# Patient Record
Sex: MALE | Race: WHITE | NOT HISPANIC OR LATINO | Employment: FULL TIME | URBAN - METROPOLITAN AREA
[De-identification: names, ages, dates, MRNs, and addresses within clinical notes are randomized per-mention and may not be internally consistent; named-entity substitution may affect disease eponyms.]

---

## 2023-07-09 DIAGNOSIS — J45.40 MODERATE PERSISTENT ASTHMA, UNCOMPLICATED (HHS-HCC): ICD-10-CM

## 2023-07-10 RX ORDER — MONTELUKAST SODIUM 10 MG/1
TABLET ORAL
Qty: 90 TABLET | Refills: 3 | Status: SHIPPED | OUTPATIENT
Start: 2023-07-10 | End: 2023-12-11 | Stop reason: SDUPTHER

## 2023-08-04 ENCOUNTER — TELEMEDICINE (OUTPATIENT)
Dept: PRIMARY CARE | Facility: CLINIC | Age: 53
End: 2023-08-04
Payer: COMMERCIAL

## 2023-08-04 DIAGNOSIS — J45.909 ASTHMA, UNSPECIFIED ASTHMA SEVERITY, UNSPECIFIED WHETHER COMPLICATED, UNSPECIFIED WHETHER PERSISTENT (HHS-HCC): ICD-10-CM

## 2023-08-04 DIAGNOSIS — J01.10 ACUTE NON-RECURRENT FRONTAL SINUSITIS: Primary | ICD-10-CM

## 2023-08-04 PROCEDURE — 99213 OFFICE O/P EST LOW 20 MIN: CPT | Performed by: NURSE PRACTITIONER

## 2023-08-04 RX ORDER — MULTIVITAMIN
1 TABLET ORAL DAILY
COMMUNITY
Start: 2019-02-08 | End: 2023-12-11 | Stop reason: ALTCHOICE

## 2023-08-04 RX ORDER — ALBUTEROL SULFATE 90 UG/1
AEROSOL, METERED RESPIRATORY (INHALATION)
COMMUNITY
End: 2023-08-04 | Stop reason: SDUPTHER

## 2023-08-04 RX ORDER — POLYETHYLENE GLYCOL 3350 17 G
POWDER IN PACKET (EA) ORAL
COMMUNITY
Start: 2019-02-08

## 2023-08-04 RX ORDER — LEVALBUTEROL TARTRATE 45 UG/1
2 AEROSOL, METERED ORAL EVERY 4 HOURS PRN
Qty: 18 G | Refills: 3 | Status: SHIPPED | OUTPATIENT
Start: 2023-08-04

## 2023-08-04 RX ORDER — FLUTICASONE PROPIONATE 50 MCG
2 SPRAY, SUSPENSION (ML) NASAL 2 TIMES DAILY
COMMUNITY
Start: 2017-10-27 | End: 2023-08-04 | Stop reason: SDUPTHER

## 2023-08-04 RX ORDER — PREDNISONE 20 MG/1
40 TABLET ORAL DAILY
Qty: 10 TABLET | Refills: 0 | Status: SHIPPED | OUTPATIENT
Start: 2023-08-04 | End: 2023-08-09

## 2023-08-04 RX ORDER — ASCORBIC ACID 500 MG
TABLET,CHEWABLE ORAL
COMMUNITY
Start: 2020-12-30 | End: 2023-12-11 | Stop reason: ALTCHOICE

## 2023-08-04 RX ORDER — ALBUTEROL SULFATE 90 UG/1
2 AEROSOL, METERED RESPIRATORY (INHALATION) EVERY 4 HOURS PRN
Qty: 18 G | Refills: 3 | Status: SHIPPED | OUTPATIENT
Start: 2023-08-04 | End: 2023-08-04

## 2023-08-04 RX ORDER — ACETAMINOPHEN 500 MG
1 TABLET ORAL DAILY
COMMUNITY
Start: 2021-09-10 | End: 2023-12-11 | Stop reason: ALTCHOICE

## 2023-08-04 RX ORDER — LOSARTAN POTASSIUM 50 MG/1
1 TABLET ORAL DAILY
COMMUNITY
Start: 2021-09-10 | End: 2023-10-10

## 2023-08-04 RX ORDER — ESCITALOPRAM OXALATE 5 MG/1
1 TABLET ORAL DAILY
COMMUNITY
Start: 2019-05-23 | End: 2023-11-21

## 2023-08-04 RX ORDER — FLUTICASONE PROPIONATE 50 MCG
2 SPRAY, SUSPENSION (ML) NASAL 2 TIMES DAILY
Qty: 16 G | Refills: 3 | Status: SHIPPED | OUTPATIENT
Start: 2023-08-04 | End: 2024-02-08 | Stop reason: SDUPTHER

## 2023-08-04 NOTE — PROGRESS NOTES
"Problem List Items Addressed This Visit       Asthma    Overview     Singulair and alb prn          Relevant Medications    albuterol 90 mcg/actuation inhaler    fluticasone (Flonase) 50 mcg/actuation nasal spray    predniSONE (Deltasone) 20 mg tablet     Other Visit Diagnoses       Acute non-recurrent frontal sinusitis    -  Primary    just completing doxy BID x 7 from [facility]  steroid burst and flonase to reduce inflammation  fu IO next week if not improving           An interactive audio/visual telecommunication system which permits real time communications between the patient (at the originating site) and provider (at the distant site) was utilized to provide this telehealth service.    Verbal consent was requested and obtained from the patient for this telehealth visit.  We have confirmed the patient wishes to see me, Fauzia Mendoza, a board certified family nurse practitioner with an active [facility] license as well as verified the patient's identity and physical location in [place].    Subjective   Patient ID: Edwin Cavanaugh is a 53 y.o. male who presents for Sick Visit.  HPI  Resp sx x about a month  - cold sx  Lingered  Now head congestion  - L side of face and forehead   Nasal drainage  Occ cough  - no audible wheeze  - asthma cough  Using inhaler  - little relief   No ear pain  No sore throat  Using flonase  Tylenol  Sudafed with some relief     TeleDoc last Friday  - doxycycline BID x 7   - doesn't feel any better    Review of Systems    BP Readings from Last 3 Encounters:   09/16/22 126/77   09/10/21 125/80   11/13/20 113/78      Wt Readings from Last 3 Encounters:   09/16/22 65.8 kg (145 lb)   09/10/21 65.3 kg (144 lb)   08/13/20 67.6 kg (149 lb)      BMI:   Estimated body mass index is 24.89 kg/m² as calculated from the following:    Height as of 9/16/22: 1.626 m (5' 4\").    Weight as of 9/16/22: 65.8 kg (145 lb).    Objective   Physical Exam  Gen: Alert, NAD  Respiratory:  resp effort NL, speaking in " complete sentences   Neuro:  coordination intact   Mood: normal

## 2023-08-26 PROBLEM — J45.40 MODERATE PERSISTENT ASTHMA WITHOUT COMPLICATION (HHS-HCC): Status: ACTIVE | Noted: 2023-08-04

## 2023-08-26 PROBLEM — E55.9 VITAMIN D DEFICIENCY: Status: ACTIVE | Noted: 2023-08-26

## 2023-08-26 PROBLEM — M54.81 CERVICO-OCCIPITAL NEURALGIA: Status: ACTIVE | Noted: 2023-08-26

## 2023-08-26 PROBLEM — I10 HYPERTENSION, ESSENTIAL: Status: ACTIVE | Noted: 2023-08-26

## 2023-08-26 PROBLEM — D22.9 MELANOCYTIC NEVUS: Status: ACTIVE | Noted: 2023-08-26

## 2023-08-26 PROBLEM — K57.30 DIVERTICULOSIS OF COLON: Status: ACTIVE | Noted: 2023-08-26

## 2023-08-26 PROBLEM — M51.9 LUMBAR DISC DISEASE: Status: ACTIVE | Noted: 2023-08-26

## 2023-08-26 PROBLEM — F41.9 ANXIETY: Status: ACTIVE | Noted: 2023-08-26

## 2023-08-26 PROBLEM — M48.061 DEGENERATIVE LUMBAR SPINAL STENOSIS: Status: ACTIVE | Noted: 2023-08-26

## 2023-08-26 PROBLEM — K64.9 HEMORRHOIDS: Status: ACTIVE | Noted: 2023-08-26

## 2023-08-26 PROBLEM — Z00.00 ROUTINE ADULT HEALTH MAINTENANCE: Status: ACTIVE | Noted: 2023-08-26

## 2023-08-26 PROBLEM — D69.6 THROMBOCYTOPENIA (CMS-HCC): Status: ACTIVE | Noted: 2023-08-26

## 2023-10-10 DIAGNOSIS — I10 ESSENTIAL (PRIMARY) HYPERTENSION: ICD-10-CM

## 2023-10-10 RX ORDER — LOSARTAN POTASSIUM 50 MG/1
50 TABLET ORAL DAILY
Qty: 90 TABLET | Refills: 3 | Status: SHIPPED | OUTPATIENT
Start: 2023-10-10 | End: 2023-12-11 | Stop reason: SDUPTHER

## 2023-11-08 ENCOUNTER — OUTSIDE PROCEDURE (OUTPATIENT)
Dept: PRIMARY CARE | Facility: CLINIC | Age: 53
End: 2023-11-08
Payer: COMMERCIAL

## 2023-11-12 ENCOUNTER — TELEPHONE VISIT (OUTPATIENT)
Dept: PRIMARY CARE | Facility: CLINIC | Age: 53
End: 2023-11-12
Payer: COMMERCIAL

## 2023-11-12 DIAGNOSIS — U07.1 COVID-19: Primary | ICD-10-CM

## 2023-11-12 PROCEDURE — 99441 PR PHYS/QHP TELEPHONE EVALUATION 5-10 MIN: CPT | Performed by: INTERNAL MEDICINE

## 2023-11-12 RX ORDER — NIRMATRELVIR AND RITONAVIR 300-100 MG
3 KIT ORAL 2 TIMES DAILY
Qty: 30 TABLET | Refills: 0 | Status: SHIPPED | OUTPATIENT
Start: 2023-11-12 | End: 2023-11-17

## 2023-11-12 RX ORDER — NIRMATRELVIR AND RITONAVIR 300-100 MG
3 KIT ORAL 2 TIMES DAILY
Qty: 30 TABLET | Refills: 0 | Status: SHIPPED | OUTPATIENT
Start: 2023-11-12 | End: 2023-11-12 | Stop reason: SDUPTHER

## 2023-11-12 NOTE — PROGRESS NOTES
CC/HPI:   Covid-19 Testing.  Tested positive  today  Symptoms are congestion, run down and chills/cold  Feels like bad cold  Usig inhaler and chlorocetin  Sx started 4 days ago  Symptoms are better today    Active Problem List  Patient Active Problem List   Diagnosis    Asthma    Routine adult health maintenance    Allergic rhinitis    Anxiety    Degenerative lumbar spinal stenosis    Diverticulosis of colon    Hemorrhoids    Hypertension, essential    Lumbar disc disease    Melanocytic nevus    Moderate persistent asthma without complication    Cervico-occipital neuralgia    Thrombocytopenia (CMS/HCC)    Vitamin D deficiency    BMI 25.0-25.9,adult       Allergies and Medications  Penicillins  Current Outpatient Medications   Medication Instructions    ascorbic acid (Vitamin C) 500 mg chewable tablet oral    cholecalciferol (Vitamin D-3) 50 mcg (2,000 unit) capsule 1 capsule, oral, Daily    escitalopram (Lexapro) 5 mg tablet 1 tablet, oral, Daily    fluticasone (Flonase) 50 mcg/actuation nasal spray 2 sprays, Each Nostril, 2 times daily    inulin (Child's Fiber Select Gummies) 1.5 gram tablet,chewable 2 gummies a day    levalbuterol (Xopenex) 45 mcg/actuation inhaler 2 puffs, inhalation, Every 4 hours PRN    losartan (COZAAR) 50 mg, oral, Daily    montelukast (Singulair) 10 mg tablet TAKE 1 TABLET BY MOUTH EVERY DAY    multivitamin (Daily Multi-Vitamin) tablet 1 tablet, oral, Daily    nirmatrelvir-ritonavir (Paxlovid) 300 mg (150 mg x 2)-100 mg tablet therapy pack 3 tablets, oral, 2 times daily, Follow the instructions on the package       ROS otherwise negative aside from what was mentioned above in HPI.    Vitals  There were no vitals taken for this visit.  There is no height or weight on file to calculate BMI.  Physical Exam  Gen: Alert, NAD        Assessment and Plan:  Problem List Items Addressed This Visit    None  Visit Diagnoses       COVID-19    -  Primary    Sent Paxlovid  He may decide to take it  f/up  prn    Relevant Medications    nirmatrelvir-ritonavir (Paxlovid) 300 mg (150 mg x 2)-100 mg tablet therapy pack          Time spent prepping/preparing for visit as well as pre/post-visit charting: 3 minutes  Time spent directly with Patient: 5 minutes  Total Time: 8 minutes

## 2023-11-21 DIAGNOSIS — F41.9 ANXIETY: Primary | ICD-10-CM

## 2023-11-21 RX ORDER — ESCITALOPRAM OXALATE 5 MG/1
5 TABLET ORAL DAILY
Qty: 90 TABLET | Refills: 3 | Status: SHIPPED | OUTPATIENT
Start: 2023-11-21 | End: 2023-12-11 | Stop reason: SDUPTHER

## 2023-11-22 ENCOUNTER — TELEPHONE (OUTPATIENT)
Dept: PRIMARY CARE | Facility: CLINIC | Age: 53
End: 2023-11-22
Payer: COMMERCIAL

## 2023-11-22 DIAGNOSIS — I10 HYPERTENSION, ESSENTIAL: ICD-10-CM

## 2023-11-22 DIAGNOSIS — Z12.5 SCREENING FOR PROSTATE CANCER: Primary | ICD-10-CM

## 2023-11-22 DIAGNOSIS — E55.9 VITAMIN D DEFICIENCY: ICD-10-CM

## 2023-11-22 DIAGNOSIS — Z00.00 ROUTINE ADULT HEALTH MAINTENANCE: ICD-10-CM

## 2023-12-02 ENCOUNTER — LAB (OUTPATIENT)
Dept: LAB | Facility: LAB | Age: 53
End: 2023-12-02
Payer: COMMERCIAL

## 2023-12-02 DIAGNOSIS — I10 HYPERTENSION, ESSENTIAL: ICD-10-CM

## 2023-12-02 DIAGNOSIS — D64.9 ANEMIA, UNSPECIFIED TYPE: Primary | ICD-10-CM

## 2023-12-02 DIAGNOSIS — Z12.5 SCREENING FOR PROSTATE CANCER: ICD-10-CM

## 2023-12-02 DIAGNOSIS — E55.9 VITAMIN D DEFICIENCY: ICD-10-CM

## 2023-12-02 DIAGNOSIS — Z00.00 ROUTINE ADULT HEALTH MAINTENANCE: ICD-10-CM

## 2023-12-02 PROCEDURE — 83540 ASSAY OF IRON: CPT

## 2023-12-02 PROCEDURE — 82607 VITAMIN B-12: CPT

## 2023-12-02 PROCEDURE — 80053 COMPREHEN METABOLIC PANEL: CPT

## 2023-12-02 PROCEDURE — 82746 ASSAY OF FOLIC ACID SERUM: CPT

## 2023-12-02 PROCEDURE — 83550 IRON BINDING TEST: CPT

## 2023-12-02 PROCEDURE — 82728 ASSAY OF FERRITIN: CPT

## 2023-12-02 PROCEDURE — 82306 VITAMIN D 25 HYDROXY: CPT

## 2023-12-02 PROCEDURE — 84443 ASSAY THYROID STIM HORMONE: CPT

## 2023-12-02 PROCEDURE — 80061 LIPID PANEL: CPT

## 2023-12-02 PROCEDURE — 84153 ASSAY OF PSA TOTAL: CPT

## 2023-12-02 PROCEDURE — 85025 COMPLETE CBC W/AUTO DIFF WBC: CPT

## 2023-12-02 PROCEDURE — 36415 COLL VENOUS BLD VENIPUNCTURE: CPT

## 2023-12-02 PROCEDURE — 81003 URINALYSIS AUTO W/O SCOPE: CPT

## 2023-12-03 LAB
25(OH)D3 SERPL-MCNC: 30 NG/ML (ref 30–100)
ALBUMIN SERPL BCP-MCNC: 3.9 G/DL (ref 3.4–5)
ALP SERPL-CCNC: 55 U/L (ref 33–120)
ALT SERPL W P-5'-P-CCNC: 19 U/L (ref 10–52)
ANION GAP SERPL CALC-SCNC: 9 MMOL/L (ref 10–20)
APPEARANCE UR: ABNORMAL
AST SERPL W P-5'-P-CCNC: 19 U/L (ref 9–39)
BASOPHILS # BLD AUTO: 0.1 X10*3/UL (ref 0–0.1)
BASOPHILS NFR BLD AUTO: 1.7 %
BILIRUB SERPL-MCNC: 0.7 MG/DL (ref 0–1.2)
BILIRUB UR STRIP.AUTO-MCNC: NEGATIVE MG/DL
BUN SERPL-MCNC: 21 MG/DL (ref 6–23)
CALCIUM SERPL-MCNC: 9.2 MG/DL (ref 8.6–10.6)
CHLORIDE SERPL-SCNC: 108 MMOL/L (ref 98–107)
CHOLEST SERPL-MCNC: 183 MG/DL (ref 0–199)
CHOLESTEROL/HDL RATIO: 3.4
CO2 SERPL-SCNC: 30 MMOL/L (ref 21–32)
COLOR UR: YELLOW
CREAT SERPL-MCNC: 0.99 MG/DL (ref 0.5–1.3)
EOSINOPHIL # BLD AUTO: 0.54 X10*3/UL (ref 0–0.7)
EOSINOPHIL NFR BLD AUTO: 9.1 %
ERYTHROCYTE [DISTWIDTH] IN BLOOD BY AUTOMATED COUNT: 12.4 % (ref 11.5–14.5)
FERRITIN SERPL-MCNC: 132 NG/ML (ref 20–300)
FOLATE SERPL-MCNC: 15.1 NG/ML
GFR SERPL CREATININE-BSD FRML MDRD: >90 ML/MIN/1.73M*2
GLUCOSE SERPL-MCNC: 90 MG/DL (ref 74–99)
GLUCOSE UR STRIP.AUTO-MCNC: NEGATIVE MG/DL
HCT VFR BLD AUTO: 40.5 % (ref 41–52)
HDLC SERPL-MCNC: 53.5 MG/DL
HGB BLD-MCNC: 13.2 G/DL (ref 13.5–17.5)
HOLD SPECIMEN: NORMAL
IMM GRANULOCYTES # BLD AUTO: 0.01 X10*3/UL (ref 0–0.7)
IMM GRANULOCYTES NFR BLD AUTO: 0.2 % (ref 0–0.9)
IRON SATN MFR SERPL: 39 % (ref 25–45)
IRON SERPL-MCNC: 120 UG/DL (ref 35–150)
KETONES UR STRIP.AUTO-MCNC: NEGATIVE MG/DL
LDLC SERPL CALC-MCNC: 109 MG/DL
LEUKOCYTE ESTERASE UR QL STRIP.AUTO: NEGATIVE
LYMPHOCYTES # BLD AUTO: 1.73 X10*3/UL (ref 1.2–4.8)
LYMPHOCYTES NFR BLD AUTO: 29 %
MCH RBC QN AUTO: 31.1 PG (ref 26–34)
MCHC RBC AUTO-ENTMCNC: 32.6 G/DL (ref 32–36)
MCV RBC AUTO: 96 FL (ref 80–100)
MONOCYTES # BLD AUTO: 0.56 X10*3/UL (ref 0.1–1)
MONOCYTES NFR BLD AUTO: 9.4 %
NEUTROPHILS # BLD AUTO: 3.02 X10*3/UL (ref 1.2–7.7)
NEUTROPHILS NFR BLD AUTO: 50.6 %
NITRITE UR QL STRIP.AUTO: NEGATIVE
NON HDL CHOLESTEROL: 130 MG/DL (ref 0–149)
NRBC BLD-RTO: 0 /100 WBCS (ref 0–0)
PH UR STRIP.AUTO: 5 [PH]
PLATELET # BLD AUTO: 236 X10*3/UL (ref 150–450)
POTASSIUM SERPL-SCNC: 4.9 MMOL/L (ref 3.5–5.3)
PROT SERPL-MCNC: 6.6 G/DL (ref 6.4–8.2)
PROT UR STRIP.AUTO-MCNC: NEGATIVE MG/DL
PSA SERPL-MCNC: 0.87 NG/ML
RBC # BLD AUTO: 4.24 X10*6/UL (ref 4.5–5.9)
RBC # UR STRIP.AUTO: NEGATIVE /UL
SODIUM SERPL-SCNC: 142 MMOL/L (ref 136–145)
SP GR UR STRIP.AUTO: 1.02
TIBC SERPL-MCNC: 305 UG/DL (ref 240–445)
TRIGL SERPL-MCNC: 103 MG/DL (ref 0–149)
TSH SERPL-ACNC: 2.81 MIU/L (ref 0.44–3.98)
UIBC SERPL-MCNC: 185 UG/DL (ref 110–370)
UROBILINOGEN UR STRIP.AUTO-MCNC: <2 MG/DL
VIT B12 SERPL-MCNC: 351 PG/ML (ref 211–911)
VLDL: 21 MG/DL (ref 0–40)
WBC # BLD AUTO: 6 X10*3/UL (ref 4.4–11.3)

## 2023-12-08 ENCOUNTER — LAB (OUTPATIENT)
Dept: LAB | Facility: LAB | Age: 53
End: 2023-12-08
Payer: COMMERCIAL

## 2023-12-08 DIAGNOSIS — D64.9 ANEMIA, UNSPECIFIED TYPE: ICD-10-CM

## 2023-12-08 PROCEDURE — 85025 COMPLETE CBC W/AUTO DIFF WBC: CPT

## 2023-12-08 PROCEDURE — 36415 COLL VENOUS BLD VENIPUNCTURE: CPT

## 2023-12-09 LAB
BASOPHILS # BLD AUTO: 0.1 X10*3/UL (ref 0–0.1)
BASOPHILS NFR BLD AUTO: 0.9 %
EOSINOPHIL # BLD AUTO: 0.39 X10*3/UL (ref 0–0.7)
EOSINOPHIL NFR BLD AUTO: 3.7 %
ERYTHROCYTE [DISTWIDTH] IN BLOOD BY AUTOMATED COUNT: 12.1 % (ref 11.5–14.5)
HCT VFR BLD AUTO: 42 % (ref 41–52)
HGB BLD-MCNC: 14 G/DL (ref 13.5–17.5)
IMM GRANULOCYTES # BLD AUTO: 0.05 X10*3/UL (ref 0–0.7)
IMM GRANULOCYTES NFR BLD AUTO: 0.5 % (ref 0–0.9)
LYMPHOCYTES # BLD AUTO: 1.83 X10*3/UL (ref 1.2–4.8)
LYMPHOCYTES NFR BLD AUTO: 17.2 %
MCH RBC QN AUTO: 31.5 PG (ref 26–34)
MCHC RBC AUTO-ENTMCNC: 33.3 G/DL (ref 32–36)
MCV RBC AUTO: 94 FL (ref 80–100)
MONOCYTES # BLD AUTO: 0.76 X10*3/UL (ref 0.1–1)
MONOCYTES NFR BLD AUTO: 7.1 %
NEUTROPHILS # BLD AUTO: 7.5 X10*3/UL (ref 1.2–7.7)
NEUTROPHILS NFR BLD AUTO: 70.6 %
NRBC BLD-RTO: 0 /100 WBCS (ref 0–0)
PLATELET # BLD AUTO: 228 X10*3/UL (ref 150–450)
RBC # BLD AUTO: 4.45 X10*6/UL (ref 4.5–5.9)
WBC # BLD AUTO: 10.6 X10*3/UL (ref 4.4–11.3)

## 2023-12-11 ENCOUNTER — OFFICE VISIT (OUTPATIENT)
Dept: PRIMARY CARE | Facility: CLINIC | Age: 53
End: 2023-12-11
Payer: COMMERCIAL

## 2023-12-11 ENCOUNTER — APPOINTMENT (OUTPATIENT)
Dept: PRIMARY CARE | Facility: CLINIC | Age: 53
End: 2023-12-11
Payer: COMMERCIAL

## 2023-12-11 ENCOUNTER — OFFICE VISIT (OUTPATIENT)
Dept: DERMATOLOGY | Facility: CLINIC | Age: 53
End: 2023-12-11
Payer: COMMERCIAL

## 2023-12-11 ENCOUNTER — LAB (OUTPATIENT)
Dept: LAB | Facility: LAB | Age: 53
End: 2023-12-11
Payer: COMMERCIAL

## 2023-12-11 DIAGNOSIS — L81.4 LENTIGO: ICD-10-CM

## 2023-12-11 DIAGNOSIS — J32.9 RECURRENT SINUSITIS: ICD-10-CM

## 2023-12-11 DIAGNOSIS — Z12.11 SCREEN FOR COLON CANCER: ICD-10-CM

## 2023-12-11 DIAGNOSIS — E55.9 VITAMIN D DEFICIENCY: ICD-10-CM

## 2023-12-11 DIAGNOSIS — F41.9 ANXIETY: ICD-10-CM

## 2023-12-11 DIAGNOSIS — J45.40 MODERATE PERSISTENT ASTHMA, UNCOMPLICATED (HHS-HCC): ICD-10-CM

## 2023-12-11 DIAGNOSIS — D22.9 NEVUS: ICD-10-CM

## 2023-12-11 DIAGNOSIS — L82.1 SEBORRHEIC KERATOSIS: ICD-10-CM

## 2023-12-11 DIAGNOSIS — Z12.83 SKIN CANCER SCREENING: Primary | ICD-10-CM

## 2023-12-11 DIAGNOSIS — D18.01 CHERRY ANGIOMA: ICD-10-CM

## 2023-12-11 DIAGNOSIS — Z12.5 SCREENING FOR PROSTATE CANCER: ICD-10-CM

## 2023-12-11 DIAGNOSIS — I10 HYPERTENSION, ESSENTIAL: ICD-10-CM

## 2023-12-11 DIAGNOSIS — Z00.00 ROUTINE ADULT HEALTH MAINTENANCE: Primary | ICD-10-CM

## 2023-12-11 PROBLEM — D69.6 THROMBOCYTOPENIA (CMS-HCC): Status: RESOLVED | Noted: 2023-08-26 | Resolved: 2023-12-11

## 2023-12-11 LAB
IGA SERPL-MCNC: 238 MG/DL (ref 70–400)
IGG SERPL-MCNC: 1360 MG/DL (ref 700–1600)
IGM SERPL-MCNC: 66 MG/DL (ref 40–230)

## 2023-12-11 PROCEDURE — 3079F DIAST BP 80-89 MM HG: CPT | Performed by: INTERNAL MEDICINE

## 2023-12-11 PROCEDURE — 99213 OFFICE O/P EST LOW 20 MIN: CPT | Performed by: NURSE PRACTITIONER

## 2023-12-11 PROCEDURE — 3080F DIAST BP >= 90 MM HG: CPT | Performed by: INTERNAL MEDICINE

## 2023-12-11 PROCEDURE — 1036F TOBACCO NON-USER: CPT | Performed by: NURSE PRACTITIONER

## 2023-12-11 PROCEDURE — 99396 PREV VISIT EST AGE 40-64: CPT | Performed by: INTERNAL MEDICINE

## 2023-12-11 PROCEDURE — 3077F SYST BP >= 140 MM HG: CPT | Performed by: INTERNAL MEDICINE

## 2023-12-11 PROCEDURE — 36415 COLL VENOUS BLD VENIPUNCTURE: CPT

## 2023-12-11 PROCEDURE — 3078F DIAST BP <80 MM HG: CPT | Performed by: INTERNAL MEDICINE

## 2023-12-11 PROCEDURE — 82784 ASSAY IGA/IGD/IGG/IGM EACH: CPT

## 2023-12-11 PROCEDURE — 3075F SYST BP GE 130 - 139MM HG: CPT | Performed by: INTERNAL MEDICINE

## 2023-12-11 PROCEDURE — 1036F TOBACCO NON-USER: CPT | Performed by: INTERNAL MEDICINE

## 2023-12-11 RX ORDER — LOSARTAN POTASSIUM 100 MG/1
100 TABLET ORAL DAILY
Qty: 90 TABLET | Refills: 3 | Status: SHIPPED | OUTPATIENT
Start: 2023-12-11

## 2023-12-11 RX ORDER — ACETAMINOPHEN 500 MG
2000 TABLET ORAL DAILY
Qty: 1 CAPSULE | Refills: 0
Start: 2023-12-11

## 2023-12-11 RX ORDER — MONTELUKAST SODIUM 10 MG/1
10 TABLET ORAL DAILY
Qty: 90 TABLET | Refills: 3 | Status: SHIPPED | OUTPATIENT
Start: 2023-12-11

## 2023-12-11 RX ORDER — ESCITALOPRAM OXALATE 5 MG/1
5 TABLET ORAL DAILY
Qty: 90 TABLET | Refills: 3 | Status: SHIPPED | OUTPATIENT
Start: 2023-12-11

## 2023-12-11 ASSESSMENT — PATIENT HEALTH QUESTIONNAIRE - PHQ9
1. LITTLE INTEREST OR PLEASURE IN DOING THINGS: NOT AT ALL
2. FEELING DOWN, DEPRESSED OR HOPELESS: NOT AT ALL
SUM OF ALL RESPONSES TO PHQ9 QUESTIONS 1 AND 2: 0

## 2023-12-11 NOTE — Clinical Note
What was his repeat BP? Need him to send in Bps via my chart over next 2 weeks Can you set a reminde so we dont forget to get these before 12/31/23?

## 2023-12-11 NOTE — PROGRESS NOTES
Subjective     Edwin Cavanaugh is a 53 y.o. male who presents for the following: Skin Check.     Established patient in for annual full-body skin exam.    Review of Systems:  No other skin or systemic complaints other than what is documented elsewhere in the note.    The following portions of the chart were reviewed this encounter and updated as appropriate:       Skin Cancer History  No skin cancer on file.    Specialty Problems          Dermatology Problems    Melanocytic nevus     Comment on above: will have DERM eval;          Past Medical History:  Edwin Cavanaugh  has a past medical history of H/O cardiovascular stress test, H/O CT scan (12/30/2020), H/O Doppler ultrasound, H/O Doppler ultrasound, H/O magnetic resonance imaging of brain and brain stem, H/O magnetic resonance imaging of lumbar spine, H/O x-ray of lumbar spine, History of PFTs (05/23/2019), and History of PFTs (02/08/2019).    Past Surgical History:  Edwin Cavanaugh  has a past surgical history that includes Colonoscopy (07/06/2018); Other surgical history (07/06/2018); and Other surgical history (07/06/2018).    Family History:  Patient family history includes No Known Problems in his mother.    Social History:  Edwin Cavanaugh  reports that he has never smoked. He has never used smokeless tobacco. He reports current alcohol use. No history on file for drug use.    Allergies:  Penicillins    Current Medications / CAM's:    Current Outpatient Medications:     ascorbic acid (Vitamin C) 500 mg chewable tablet, Chew., Disp: , Rfl:     cholecalciferol (Vitamin D-3) 50 mcg (2,000 unit) capsule, Take 1 capsule (50 mcg) by mouth once daily., Disp: , Rfl:     escitalopram (Lexapro) 5 mg tablet, TAKE 1 TABLET BY MOUTH EVERY DAY, Disp: 90 tablet, Rfl: 3    fluticasone (Flonase) 50 mcg/actuation nasal spray, Administer 2 sprays into each nostril 2 times a day., Disp: 16 g, Rfl: 3    inulin (Child's Fiber Select Gummies) 1.5 gram tablet,chewable, 2 gummies a day,  Disp: , Rfl:     levalbuterol (Xopenex) 45 mcg/actuation inhaler, Inhale 2 puffs every 4 hours if needed for wheezing or shortness of breath., Disp: 18 g, Rfl: 3    losartan (Cozaar) 50 mg tablet, TAKE 1 TABLET BY MOUTH EVERY DAY, Disp: 90 tablet, Rfl: 3    montelukast (Singulair) 10 mg tablet, TAKE 1 TABLET BY MOUTH EVERY DAY, Disp: 90 tablet, Rfl: 3    multivitamin (Daily Multi-Vitamin) tablet, Take 1 tablet by mouth once daily., Disp: , Rfl:      Objective   Well appearing patient in no apparent distress; mood and affect are within normal limits.    A full examination was performed including scalp, head, eyes, ears, nose, lips, neck, chest, axillae, abdomen, back, buttocks, bilateral upper extremities, bilateral lower extremities, hands, feet, fingers, toes, fingernails, and toenails. All findings within normal limits unless otherwise noted below.    Assessment/Plan   1. Skin cancer screening    The patient presented for a routine skin examination today. There are no specific concerns regarding skin health and no new or changing moles, lesions, or rashes.     Assessment: Based on the comprehensive skin examination, there were no concerning or abnormal findings. The patient's skin appeared to be in good health, without any notable dermatologic conditions or lesions.    Plan: Given the absence of any significant skin findings, no specific interventions or treatments are warranted at this time. The patient was educated on the importance of regular skin self-examinations and advised to promptly report any changes or concerns. Routine follow-up for a skin examination was recommended.    -These lesions have benign, reassuring patterns on dermoscopy.  -There were no concerning features found on exam today.  -Recommend continued self-observation, and to contact the office if any changes in nevi are  noticed.    Discussed/information given on safe sun practices and use of sunscreen, sun protective clothing or sun  "avoidance. Recommend to use OTC medication of sunscreen SPF 30 or higher on a daily basis prior to sun exposure to reduce the risk of skin cancer.    Contact Office if: Any lesions change in size, shape or color; itch, bum or bleed.         2. Nevus  Multiple benign appearing flesh colored to pigmented macules and papules     Plan: Counseling.  I counseled the patient regarding the following:  Instructions: Monthly self-skin checks to monitor for any changes in moles are recommended. Expectations: Benign Nevi are pigmented nests of cells within the skin.No treatment is necessary. Contact Office if: Any moles change in size, shape or color; itch, bum or bleed.    3. Lentigo  Benign pigmented macules appearing in sun exposed areas     Solar lentigo (a type of lentigo also known as a senile lentigo, age spot, or liver spot) is a benign pigmented macule appearing on fair-skinned individuals that is related to ultraviolet radiation (UVR) exposure, typically from the sun.     PLAN:  Limiting sun exposure through avoidance, protective clothing, and use of sunscreens can help prevent the appearance of solar lentigines.    If lesion changes or becomes symptomatic she should return to clinic    4. Kimball angioma  Small (2-10 mm), bright red or violaceous macules or smooth, domed papules    PLAN:  Reassurance these lesions are benign  Treatment is only indicated in the case of irritation or hemorrhage    5. Seborrheic keratosis    Seborrheic keratoses (SKs) are extremely common benign neoplasms of the skin. There can be few or hundreds of these raised, \"stuck-on\"-appearing papules and plaques with well-defined borders. The cause is unknown, although there is a familial trait for the development of multiple SKs.      SKs tend to increase in incidence and number with increasing age.     Skin Care: Seborrheic Keratoses are benign. No treatment is necessary.    Patient was instructed to call the office if any lesions become " irritated or inflamed

## 2023-12-11 NOTE — PROGRESS NOTES
His BP was 146/80  I will call and tell him jesi we need the numbers before the 31st and I will make a note to call him on the 22nd to make sure he is sending it the following week.

## 2023-12-11 NOTE — PROGRESS NOTES
ANNUAL CPE VISIT  Chief Complaint   Patient presents with    Annual Exam     HPI: BP at home not checked    C/O shooting pain in one spot side, leg, arm  Small concentrated spot when occurs  Finger can point to the spot  Used to get them in the head    When gets a cold gets 4 weeks of symptoms  Always pressure/HA and takes forever to go away  Still feels symptoms from his Covid infection 11/9/23     Hamstring hurt after COVID  Is a runner  Ball of his foot hurts  Tried different shoes    Lavs reviewed:  Component      Latest Ref Rng 12/2/2023   WBC      4.4 - 11.3 x10*3/uL 6.0    nRBC      0.0 - 0.0 /100 WBCs 0.0    RBC      4.50 - 5.90 x10*6/uL 4.24 (L)    HEMOGLOBIN      13.5 - 17.5 g/dL 13.2 (L)    HEMATOCRIT      41.0 - 52.0 % 40.5 (L)    MCV      80 - 100 fL 96    MCH      26.0 - 34.0 pg 31.1    MCHC      32.0 - 36.0 g/dL 32.6    RED CELL DISTRIBUTION WIDTH      11.5 - 14.5 % 12.4    Platelets      150 - 450 x10*3/uL 236    Neutrophils %      40.0 - 80.0 % 50.6    Immature Granulocytes %, Automated      0.0 - 0.9 % 0.2    Lymphocytes %      13.0 - 44.0 % 29.0    Monocytes %      2.0 - 10.0 % 9.4    Eosinophils %      0.0 - 6.0 % 9.1    Basophils %      0.0 - 2.0 % 1.7    Neutrophils Absolute      1.20 - 7.70 x10*3/uL 3.02    Immature Granulocytes Absolute, Automated      0.00 - 0.70 x10*3/uL 0.01    Lymphocytes Absolute      1.20 - 4.80 x10*3/uL 1.73    Monocytes Absolute      0.10 - 1.00 x10*3/uL 0.56    Eosinophils Absolute      0.00 - 0.70 x10*3/uL 0.54    Basophils Absolute      0.00 - 0.10 x10*3/uL 0.10    GLUCOSE      74 - 99 mg/dL 90    SODIUM      136 - 145 mmol/L 142    POTASSIUM      3.5 - 5.3 mmol/L 4.9    CHLORIDE      98 - 107 mmol/L 108 (H)    Bicarbonate      21 - 32 mmol/L 30    Anion Gap      10 - 20 mmol/L 9 (L)    Blood Urea Nitrogen      6 - 23 mg/dL 21    Creatinine      0.50 - 1.30 mg/dL 0.99    EGFR      >60 mL/min/1.73m*2 >90    Calcium      8.6 - 10.6 mg/dL 9.2    Albumin      3.4 - 5.0  g/dL 3.9    Alkaline Phosphatase      33 - 120 U/L 55    Total Protein      6.4 - 8.2 g/dL 6.6    AST      9 - 39 U/L 19    Bilirubin Total      0.0 - 1.2 mg/dL 0.7    ALT      10 - 52 U/L 19    Color, Urine      Straw, Yellow  Yellow    Appearance, Urine      Clear  Hazy ! (N)    Specific Gravity, Urine      1.005 - 1.035  1.025    pH, Urine      5.0, 5.5, 6.0, 6.5, 7.0, 7.5, 8.0  5.0    Protein, Urine      NEGATIVE mg/dL NEGATIVE    Glucose, Urine      NEGATIVE mg/dL NEGATIVE    Blood, Urine      NEGATIVE  NEGATIVE    Ketones, Urine      NEGATIVE mg/dL NEGATIVE    Bilirubin, Urine      NEGATIVE  NEGATIVE    Urobilinogen, Urine      <2.0 mg/dL <2.0    Nitrite, Urine      NEGATIVE  NEGATIVE    Leukocyte Esterase, Urine      NEGATIVE  NEGATIVE    CHOLESTEROL      0 - 199 mg/dL 183    HDL CHOLESTEROL      mg/dL 53.5    Cholesterol/HDL Ratio 3.4    LDL Calculated      <=99 mg/dL 109 (H)    VLDL      0 - 40 mg/dL 21    TRIGLYCERIDES      0 - 149 mg/dL 103    Non HDL Cholesterol      0 - 149 mg/dL 130    IRON      35 - 150 ug/dL 120    UIBC      110 - 370 ug/dL 185    TIBC      240 - 445 ug/dL 305    % Saturation      25 - 45 % 39    Vitamin D, 25-Hydroxy, Total      30 - 100 ng/mL 30    Thyroid Stimulating Hormone      0.44 - 3.98 mIU/L 2.81    Prostate Specific Antigen,Screen      <=4.00 ng/mL 0.87    Extra Tube Hold for add-ons.    FERRITIN      20 - 300 ng/mL 132    Vitamin B12      211 - 911 pg/mL 351    FOLATE      >5.0 ng/mL 15.1       Component      Latest Ref Rng 12/8/2023   WBC      4.4 - 11.3 x10*3/uL 10.6    nRBC      0.0 - 0.0 /100 WBCs 0.0    RBC      4.50 - 5.90 x10*6/uL 4.45 (L)    HEMOGLOBIN      13.5 - 17.5 g/dL 14.0    HEMATOCRIT      41.0 - 52.0 % 42.0    MCV      80 - 100 fL 94    MCH      26.0 - 34.0 pg 31.5    MCHC      32.0 - 36.0 g/dL 33.3    RED CELL DISTRIBUTION WIDTH      11.5 - 14.5 % 12.1    Platelets      150 - 450 x10*3/uL 228    Neutrophils %      40.0 - 80.0 % 70.6    Immature  Granulocytes %, Automated      0.0 - 0.9 % 0.5    Lymphocytes %      13.0 - 44.0 % 17.2    Monocytes %      2.0 - 10.0 % 7.1    Eosinophils %      0.0 - 6.0 % 3.7    Basophils %      0.0 - 2.0 % 0.9    Neutrophils Absolute      1.20 - 7.70 x10*3/uL 7.50    Immature Granulocytes Absolute, Automated      0.00 - 0.70 x10*3/uL 0.05    Lymphocytes Absolute      1.20 - 4.80 x10*3/uL 1.83    Monocytes Absolute      0.10 - 1.00 x10*3/uL 0.76    Eosinophils Absolute      0.00 - 0.70 x10*3/uL 0.39    Basophils Absolute      0.00 - 0.10 x10*3/uL 0.10             Assessment and Plan:  Problem List Items Addressed This Visit          High    Routine adult health maintenance - Primary    Overview     COVID 19 Moderna Vaccine 3/19/21, 4/16/21, 12/11/21, 9/16/22, 10/13/23  Influenza 9/13/23  Pneumococcal-13 Vac Bctflotuc94/11/2014   TD Adult2/1/2008  TDaP 8/3/18  PSA 6/15/19 1.56; 9/3/21 (1.1)  Cscope 2014(10yrs)  Influenza 11/6/20         Current Assessment & Plan     Annual Wellness exam completed   Preventive Health History reviewed  Ordered:   Labs    Cscope            Relevant Orders    Comprehensive Metabolic Panel    CBC and Auto Differential    Lipid Panel    Urinalysis with Reflex Microscopic and Culture       Medium    Anxiety    Overview     Comment on above: GAD76/2020 = 9/21;  GAD76/2020 = 9/21On SSRI;         Relevant Medications    escitalopram (Lexapro) 5 mg tablet    Other Relevant Orders    TSH with reflex to Free T4 if abnormal    Hypertension, essential    Overview     Goal <130/80  6/20: our cuff 124/75 hr 50s his cuff 126/80 hr 57   On ARB         Current Assessment & Plan     Increase ARB to 100mg         Relevant Orders    Albumin, urine, random    RESOLVED: Moderate persistent asthma, uncomplicated    Relevant Medications    montelukast (Singulair) 10 mg tablet    losartan (Cozaar) 100 mg tablet    Screen for colon cancer    Relevant Orders    Colonoscopy Screening; Average Risk Patient    Screening for  "prostate cancer    Relevant Orders    Prostate Specific Antigen, Screen    Vitamin D deficiency    Overview     12/23: 30  Goal ~50         Current Assessment & Plan     Add 2K daily         Relevant Medications    cholecalciferol (Vitamin D3) 50 mcg (2,000 unit) capsule    Other Relevant Orders    Vitamin D 25-Hydroxy,Total (for eval of Vitamin D levels)     Other Visit Diagnoses       Recurrent sinusitis        Discussed better sinus care when infected  (rinse, nettipot etc)  Will get IG levels    Relevant Orders    Immunoglobulins, IgG, IgA, IgM (Completed)            ROS otherwise negative aside from what was mentioned above in HPI.    Vitals  /80   Pulse 58   Temp 36.9 °C (98.4 °F) (Temporal)   Ht 1.626 m (5' 4\")   Wt 67.1 kg (148 lb)   BMI 25.40 kg/m²   Body mass index is 25.4 kg/m².  Physical Exam  Gen: Alert, NAD  HEENT:  Normal exam  Neck:  No masses/nodes palpable; Thyroid nontender and without nodules; No DAVID  Respiratory:  Lungs CTAB  CV: RRR  Neuro:  Gross motor and sensory intact  Skin:  No suspicious lesions present  Breast: No masses or axillary lymphadenopathy  ALYSIA: Prostate not enlarged. No prostate mass or nodule(s) palpated, brown stool. + Internal hemorroid palpated (Pt declined chaperone)    Active Problem List  Patient Active Problem List   Diagnosis    Asthma    Routine adult health maintenance    Allergic rhinitis    Anxiety    Degenerative lumbar spinal stenosis    Diverticulosis of colon    Hemorrhoids    Hypertension, essential    Lumbar disc disease    Melanocytic nevus    Moderate persistent asthma without complication    Cervico-occipital neuralgia    Vitamin D deficiency    BMI 25.0-25.9,adult    Screening for prostate cancer    Screen for colon cancer       Comprehensive Medical/Surgical/Social/Family History  Past Medical History:   Diagnosis Date    H/O cardiovascular stress test     5/12: normal 1/2021:Summary: 1. The resting ejection fraction was estimated at 60 to 65% " with a peak exercise ejection fraction estimated at 70 to 75%. 2. Peak HR= 164bpm which is 98% of APMHR. 3. Peak BP= 188/74. 4. METS acheived= 13.4. 5. Above average exercise capacity. 6. No ECG changes from baseline. 7. No echocardiographic or electrocardiographic evidence for ischemia at a maximal workload.    H/O CT scan 12/30/2020    10/11/21: CT Calcium score =0    H/O Doppler ultrasound     Mild-moderate stenosis at the proximal segment without plaque. There appears to be a diameter mismatch of proximal to mid subclavian artery US carotids 6/20: IMPRESSION: Minimal bilateral carotid plaque; no stenosis greater than 50%.    H/O Doppler ultrasound     6/2020: bilat carotid US Minimal bilateral carotid plaque; no stenosis greater than 50%.    H/O magnetic resonance imaging of brain and brain stem     05/04: MILD PARANASAL SINUS INFLAMMATORY CHANGES.  OTHERWISE NORMAL    H/O magnetic resonance imaging of lumbar spine     4/17: degenrative changes L4-5 and L5-S1 w/disc space narrpowing and disk dessication, posterior disk bbulge. FAcet joint sclerosis and hypertrophy and LF hypertrophy, moderate stenosis of bilateral lateral recesses, at L4-5, wqorse on right. MIld stenosis lateral receses laterally L5-S1 worsde left. impingement upon exiting nerve roots biltarelly L4-5 and L5-S1 left. bilateral renal cysts    H/O x-ray of lumbar spine     2017: MILD DEGENERATIVE CHANGES    History of PFTs 05/23/2019    Reactive airway component with significant bronchodilator response in small airways despite normal PFTs. Repeat PFTs with lung volumes rec'd. 5/12    History of PFTs 02/08/2019    2008: Reactive airway component with significant bronchodilator response in small airways despite normal PFTs. Repeat PFTs with lung volumes rec'd     Past Surgical History:   Procedure Laterality Date    COLONOSCOPY      5/14: Diverticulosis in the sigmoid colon. Internal & External hemorrhoids. repeat in 10 years    OTHER SURGICAL  HISTORY  07/06/2018    Pulmonary Function Tests    OTHER SURGICAL HISTORY  07/06/2018    Oral Surgery Tooth Extraction [facility]     Social History     Social History Narrative     ([place]), one daughter    Works at [employer]    Nonsmoker    Occ ETOH    ---    Family History:    M: Gastric CA, Hypertension       F: Anxiety, AKs, Sleep issues, ET?                 B:  Asthma                                                    S:  Allergies                                                        Allergies and Medications  Lisinopril and Penicillins  Current Outpatient Medications   Medication Instructions    cholecalciferol (VITAMIN D3) 50 mcg, oral, Daily    escitalopram (LEXAPRO) 5 mg, oral, Daily    fluticasone (Flonase) 50 mcg/actuation nasal spray 2 sprays, Each Nostril, 2 times daily    hydroCHLOROthiazide (HYDRODIURIL) 12.5 mg, oral, Daily    inulin (Child's Fiber Select Gummies) 1.5 gram tablet,chewable 2 gummies a day    levalbuterol (Xopenex) 45 mcg/actuation inhaler 2 puffs, inhalation, Every 4 hours PRN    losartan (COZAAR) 100 mg, oral, Daily    montelukast (SINGULAIR) 10 mg, oral, Daily

## 2023-12-11 NOTE — ASSESSMENT & PLAN NOTE
Add 2K daily  
Annual Wellness exam completed   Preventive Health History reviewed  Ordered:   Labs    Cscope     
Increase ARB to 100mg  
Patient lab wnl. endorses pain resolved. has dialysis in a few hours. endorses that he will f.u pmd. return precaution provided

## 2023-12-23 VITALS
BODY MASS INDEX: 25.27 KG/M2 | TEMPERATURE: 98.4 F | DIASTOLIC BLOOD PRESSURE: 80 MMHG | WEIGHT: 148 LBS | HEART RATE: 58 BPM | HEIGHT: 64 IN | SYSTOLIC BLOOD PRESSURE: 146 MMHG

## 2023-12-23 DIAGNOSIS — I10 HYPERTENSION, ESSENTIAL: Primary | ICD-10-CM

## 2023-12-23 RX ORDER — HYDROCHLOROTHIAZIDE 12.5 MG/1
12.5 TABLET ORAL DAILY
Qty: 30 TABLET | Refills: 0 | Status: SHIPPED | OUTPATIENT
Start: 2023-12-23 | End: 2024-01-17

## 2024-01-17 DIAGNOSIS — I10 HYPERTENSION, ESSENTIAL: ICD-10-CM

## 2024-01-17 RX ORDER — HYDROCHLOROTHIAZIDE 12.5 MG/1
12.5 TABLET ORAL DAILY
Qty: 90 TABLET | Refills: 3 | Status: SHIPPED | OUTPATIENT
Start: 2024-01-17

## 2024-02-08 ENCOUNTER — PATIENT MESSAGE (OUTPATIENT)
Dept: PRIMARY CARE | Facility: CLINIC | Age: 54
End: 2024-02-08
Payer: COMMERCIAL

## 2024-02-08 DIAGNOSIS — J45.909 ASTHMA, UNSPECIFIED ASTHMA SEVERITY, UNSPECIFIED WHETHER COMPLICATED, UNSPECIFIED WHETHER PERSISTENT (HHS-HCC): ICD-10-CM

## 2024-02-08 RX ORDER — FLUTICASONE PROPIONATE 50 MCG
2 SPRAY, SUSPENSION (ML) NASAL 2 TIMES DAILY
Qty: 16 G | Refills: 3 | Status: SHIPPED | OUTPATIENT
Start: 2024-02-08 | End: 2024-04-15 | Stop reason: SDUPTHER

## 2024-02-08 NOTE — TELEPHONE ENCOUNTER
From: Edwin Cavanaugh  To: JULIANE Gibbons-CNP  Sent: 2/8/2024 12:05 PM EST  Subject: Fluticasome renew prescription     Good afternoon. I ran out of my fluticasone and I'm wondering if you can prescribe refills for me.    Thank you  Prabhakar

## 2024-04-15 RX ORDER — FLUTICASONE PROPIONATE 50 MCG
2 SPRAY, SUSPENSION (ML) NASAL 2 TIMES DAILY
Qty: 16 G | Refills: 3 | Status: SHIPPED | OUTPATIENT
Start: 2024-04-15

## 2024-06-18 DIAGNOSIS — J45.909 ASTHMA, UNSPECIFIED ASTHMA SEVERITY, UNSPECIFIED WHETHER COMPLICATED, UNSPECIFIED WHETHER PERSISTENT (HHS-HCC): ICD-10-CM

## 2024-06-18 RX ORDER — PREDNISONE 20 MG/1
40 TABLET ORAL DAILY
Qty: 10 TABLET | Refills: 0 | Status: SHIPPED | OUTPATIENT
Start: 2024-06-18 | End: 2024-06-23

## 2024-07-30 ENCOUNTER — PATIENT MESSAGE (OUTPATIENT)
Dept: PRIMARY CARE | Facility: CLINIC | Age: 54
End: 2024-07-30
Payer: COMMERCIAL

## 2024-07-30 DIAGNOSIS — J45.909 ASTHMA, UNSPECIFIED ASTHMA SEVERITY, UNSPECIFIED WHETHER COMPLICATED, UNSPECIFIED WHETHER PERSISTENT (HHS-HCC): ICD-10-CM

## 2024-07-30 RX ORDER — FLUTICASONE PROPIONATE 50 MCG
2 SPRAY, SUSPENSION (ML) NASAL 2 TIMES DAILY
Qty: 16 G | Refills: 3 | Status: SHIPPED | OUTPATIENT
Start: 2024-07-30

## 2024-08-27 DIAGNOSIS — J45.909 ASTHMA, UNSPECIFIED ASTHMA SEVERITY, UNSPECIFIED WHETHER COMPLICATED, UNSPECIFIED WHETHER PERSISTENT (HHS-HCC): ICD-10-CM

## 2024-08-27 NOTE — PATIENT COMMUNICATION
Spoke with patient.  Relayed message and gave him the correct directions.  Patient verbally understood.    Medication list edited to correct sig.

## 2024-11-29 DIAGNOSIS — J45.40 MODERATE PERSISTENT ASTHMA, UNCOMPLICATED (HHS-HCC): ICD-10-CM

## 2024-11-29 RX ORDER — LOSARTAN POTASSIUM 100 MG/1
100 TABLET ORAL DAILY
Qty: 90 TABLET | Refills: 0 | Status: SHIPPED | OUTPATIENT
Start: 2024-11-29

## 2024-12-02 ENCOUNTER — LAB (OUTPATIENT)
Dept: LAB | Facility: LAB | Age: 54
End: 2024-12-02
Payer: COMMERCIAL

## 2024-12-02 DIAGNOSIS — I10 HYPERTENSION, ESSENTIAL: ICD-10-CM

## 2024-12-02 DIAGNOSIS — Z12.5 SCREENING FOR PROSTATE CANCER: ICD-10-CM

## 2024-12-02 DIAGNOSIS — Z00.00 ROUTINE ADULT HEALTH MAINTENANCE: ICD-10-CM

## 2024-12-02 DIAGNOSIS — F41.9 ANXIETY: ICD-10-CM

## 2024-12-02 DIAGNOSIS — E55.9 VITAMIN D DEFICIENCY: ICD-10-CM

## 2024-12-02 LAB
25(OH)D3 SERPL-MCNC: 37 NG/ML (ref 30–100)
ALBUMIN SERPL BCP-MCNC: 4 G/DL (ref 3.4–5)
ALP SERPL-CCNC: 50 U/L (ref 33–120)
ALT SERPL W P-5'-P-CCNC: 25 U/L (ref 10–52)
ANION GAP SERPL CALC-SCNC: 9 MMOL/L (ref 10–20)
APPEARANCE UR: CLEAR
AST SERPL W P-5'-P-CCNC: 22 U/L (ref 9–39)
BASOPHILS # BLD AUTO: 0.08 X10*3/UL (ref 0–0.1)
BASOPHILS NFR BLD AUTO: 1.4 %
BILIRUB SERPL-MCNC: 0.8 MG/DL (ref 0–1.2)
BILIRUB UR STRIP.AUTO-MCNC: NEGATIVE MG/DL
BUN SERPL-MCNC: 13 MG/DL (ref 6–23)
CALCIUM SERPL-MCNC: 9.5 MG/DL (ref 8.6–10.6)
CHLORIDE SERPL-SCNC: 105 MMOL/L (ref 98–107)
CHOLEST SERPL-MCNC: 203 MG/DL (ref 0–199)
CHOLESTEROL/HDL RATIO: 3.6
CO2 SERPL-SCNC: 33 MMOL/L (ref 21–32)
COLOR UR: NORMAL
CREAT SERPL-MCNC: 1.05 MG/DL (ref 0.5–1.3)
CREAT UR-MCNC: 109.1 MG/DL (ref 20–370)
EGFRCR SERPLBLD CKD-EPI 2021: 84 ML/MIN/1.73M*2
EOSINOPHIL # BLD AUTO: 0.28 X10*3/UL (ref 0–0.7)
EOSINOPHIL NFR BLD AUTO: 5 %
ERYTHROCYTE [DISTWIDTH] IN BLOOD BY AUTOMATED COUNT: 12 % (ref 11.5–14.5)
GLUCOSE SERPL-MCNC: 92 MG/DL (ref 74–99)
GLUCOSE UR STRIP.AUTO-MCNC: NORMAL MG/DL
HCT VFR BLD AUTO: 42.3 % (ref 41–52)
HDLC SERPL-MCNC: 57.1 MG/DL
HGB BLD-MCNC: 14.4 G/DL (ref 13.5–17.5)
HOLD SPECIMEN: NORMAL
IMM GRANULOCYTES # BLD AUTO: 0.02 X10*3/UL (ref 0–0.7)
IMM GRANULOCYTES NFR BLD AUTO: 0.4 % (ref 0–0.9)
KETONES UR STRIP.AUTO-MCNC: NEGATIVE MG/DL
LDLC SERPL CALC-MCNC: 124 MG/DL
LEUKOCYTE ESTERASE UR QL STRIP.AUTO: NEGATIVE
LYMPHOCYTES # BLD AUTO: 1.56 X10*3/UL (ref 1.2–4.8)
LYMPHOCYTES NFR BLD AUTO: 27.7 %
MCH RBC QN AUTO: 32.3 PG (ref 26–34)
MCHC RBC AUTO-ENTMCNC: 34 G/DL (ref 32–36)
MCV RBC AUTO: 95 FL (ref 80–100)
MICROALBUMIN UR-MCNC: <7 MG/L
MICROALBUMIN/CREAT UR: NORMAL MG/G{CREAT}
MONOCYTES # BLD AUTO: 0.51 X10*3/UL (ref 0.1–1)
MONOCYTES NFR BLD AUTO: 9.1 %
NEUTROPHILS # BLD AUTO: 3.18 X10*3/UL (ref 1.2–7.7)
NEUTROPHILS NFR BLD AUTO: 56.4 %
NITRITE UR QL STRIP.AUTO: NEGATIVE
NON HDL CHOLESTEROL: 146 MG/DL (ref 0–149)
NRBC BLD-RTO: 0 /100 WBCS (ref 0–0)
PH UR STRIP.AUTO: 5.5 [PH]
PLATELET # BLD AUTO: 242 X10*3/UL (ref 150–450)
POTASSIUM SERPL-SCNC: 4.3 MMOL/L (ref 3.5–5.3)
PROT SERPL-MCNC: 6.7 G/DL (ref 6.4–8.2)
PROT UR STRIP.AUTO-MCNC: NEGATIVE MG/DL
PSA SERPL-MCNC: 1.04 NG/ML
RBC # BLD AUTO: 4.46 X10*6/UL (ref 4.5–5.9)
RBC # UR STRIP.AUTO: NEGATIVE /UL
SODIUM SERPL-SCNC: 143 MMOL/L (ref 136–145)
SP GR UR STRIP.AUTO: 1.01
TRIGL SERPL-MCNC: 110 MG/DL (ref 0–149)
TSH SERPL-ACNC: 2.16 MIU/L (ref 0.44–3.98)
UROBILINOGEN UR STRIP.AUTO-MCNC: NORMAL MG/DL
VLDL: 22 MG/DL (ref 0–40)
WBC # BLD AUTO: 5.6 X10*3/UL (ref 4.4–11.3)

## 2024-12-02 PROCEDURE — 84443 ASSAY THYROID STIM HORMONE: CPT

## 2024-12-02 PROCEDURE — 85025 COMPLETE CBC W/AUTO DIFF WBC: CPT

## 2024-12-02 PROCEDURE — 84153 ASSAY OF PSA TOTAL: CPT

## 2024-12-02 PROCEDURE — 82043 UR ALBUMIN QUANTITATIVE: CPT

## 2024-12-02 PROCEDURE — 80053 COMPREHEN METABOLIC PANEL: CPT

## 2024-12-02 PROCEDURE — 81003 URINALYSIS AUTO W/O SCOPE: CPT

## 2024-12-02 PROCEDURE — 82306 VITAMIN D 25 HYDROXY: CPT

## 2024-12-02 PROCEDURE — 36415 COLL VENOUS BLD VENIPUNCTURE: CPT

## 2024-12-02 PROCEDURE — 80061 LIPID PANEL: CPT

## 2024-12-02 PROCEDURE — 82570 ASSAY OF URINE CREATININE: CPT

## 2024-12-16 ENCOUNTER — APPOINTMENT (OUTPATIENT)
Dept: PRIMARY CARE | Facility: CLINIC | Age: 54
End: 2024-12-16
Payer: COMMERCIAL

## 2024-12-16 ENCOUNTER — APPOINTMENT (OUTPATIENT)
Dept: DERMATOLOGY | Facility: CLINIC | Age: 54
End: 2024-12-16
Payer: COMMERCIAL

## 2024-12-16 VITALS
HEART RATE: 72 BPM | HEIGHT: 65 IN | BODY MASS INDEX: 24.39 KG/M2 | TEMPERATURE: 97.5 F | WEIGHT: 146.38 LBS | OXYGEN SATURATION: 98 % | SYSTOLIC BLOOD PRESSURE: 123 MMHG | DIASTOLIC BLOOD PRESSURE: 81 MMHG

## 2024-12-16 DIAGNOSIS — L81.4 LENTIGO: ICD-10-CM

## 2024-12-16 DIAGNOSIS — J30.9 ALLERGIC RHINITIS, UNSPECIFIED SEASONALITY, UNSPECIFIED TRIGGER: ICD-10-CM

## 2024-12-16 DIAGNOSIS — Z12.83 SCREENING EXAM FOR SKIN CANCER: ICD-10-CM

## 2024-12-16 DIAGNOSIS — L82.1 SEBORRHEIC KERATOSIS: ICD-10-CM

## 2024-12-16 DIAGNOSIS — Z12.5 SCREENING FOR PROSTATE CANCER: ICD-10-CM

## 2024-12-16 DIAGNOSIS — E55.9 VITAMIN D DEFICIENCY: ICD-10-CM

## 2024-12-16 DIAGNOSIS — Z00.00 ROUTINE ADULT HEALTH MAINTENANCE: Primary | ICD-10-CM

## 2024-12-16 DIAGNOSIS — Z12.83 SKIN CANCER SCREENING: ICD-10-CM

## 2024-12-16 DIAGNOSIS — Z12.11 SCREEN FOR COLON CANCER: ICD-10-CM

## 2024-12-16 DIAGNOSIS — I10 HYPERTENSION, ESSENTIAL: ICD-10-CM

## 2024-12-16 DIAGNOSIS — D22.9 NEVUS: Primary | ICD-10-CM

## 2024-12-16 DIAGNOSIS — J45.40 MODERATE PERSISTENT ASTHMA, UNSPECIFIED WHETHER COMPLICATED (HHS-HCC): ICD-10-CM

## 2024-12-16 DIAGNOSIS — F41.9 ANXIETY: ICD-10-CM

## 2024-12-16 DIAGNOSIS — D18.01 CHERRY ANGIOMA: ICD-10-CM

## 2024-12-16 PROBLEM — J45.909 ASTHMA: Status: RESOLVED | Noted: 2023-08-04 | Resolved: 2024-12-16

## 2024-12-16 PROCEDURE — 1036F TOBACCO NON-USER: CPT | Performed by: NURSE PRACTITIONER

## 2024-12-16 PROCEDURE — 99396 PREV VISIT EST AGE 40-64: CPT | Performed by: INTERNAL MEDICINE

## 2024-12-16 PROCEDURE — 3079F DIAST BP 80-89 MM HG: CPT | Performed by: INTERNAL MEDICINE

## 2024-12-16 PROCEDURE — 99213 OFFICE O/P EST LOW 20 MIN: CPT | Performed by: NURSE PRACTITIONER

## 2024-12-16 PROCEDURE — 3008F BODY MASS INDEX DOCD: CPT | Performed by: INTERNAL MEDICINE

## 2024-12-16 PROCEDURE — 90750 HZV VACC RECOMBINANT IM: CPT | Performed by: INTERNAL MEDICINE

## 2024-12-16 PROCEDURE — 90471 IMMUNIZATION ADMIN: CPT | Performed by: INTERNAL MEDICINE

## 2024-12-16 PROCEDURE — 1036F TOBACCO NON-USER: CPT | Performed by: INTERNAL MEDICINE

## 2024-12-16 PROCEDURE — 3074F SYST BP LT 130 MM HG: CPT | Performed by: INTERNAL MEDICINE

## 2024-12-16 RX ORDER — FLUTICASONE FUROATE AND VILANTEROL 100; 25 UG/1; UG/1
1 POWDER RESPIRATORY (INHALATION) DAILY
Qty: 1 EACH | Refills: 3 | Status: SHIPPED | OUTPATIENT
Start: 2024-12-16

## 2024-12-16 NOTE — ASSESSMENT & PLAN NOTE
Annual Wellness exam completed   Preventive Health History reviewed  Ordered:   Labs    Cologuard  Shingrix #1 today, [facility]

## 2024-12-16 NOTE — PROGRESS NOTES
Subjective     Edwin Cavanaugh is a 54 y.o. male who presents for the following: Skin Check.   Established patient in for full body skin exam.     Review of Systems:  No other skin or systemic complaints other than what is documented elsewhere in the note.    The following portions of the chart were reviewed this encounter and updated as appropriate:       Skin Cancer History  No skin cancer on file.    Specialty Problems          Dermatology Problems    Melanocytic nevus     Comment on above: will have DERM eval;          Past Medical History:  Edwin Cavanaugh  has a past medical history of H/O cardiovascular stress test, H/O CT scan (12/30/2020), H/O Doppler ultrasound, H/O Doppler ultrasound, H/O magnetic resonance imaging of brain and brain stem, H/O magnetic resonance imaging of lumbar spine, H/O x-ray of lumbar spine, History of PFTs (05/23/2019), and History of PFTs (02/08/2019).    Past Surgical History:  Edwin Cavanaugh  has a past surgical history that includes Colonoscopy; Other surgical history (07/06/2018); and Other surgical history (07/06/2018).    Family History:  Patient family history includes No Known Problems in his mother.    Social History:  Edwin Cavanaugh  reports that he has never smoked. He has never used smokeless tobacco. He reports current alcohol use. No history on file for drug use.    Allergies:  Lisinopril and Penicillins    Current Medications / CAM's:    Current Outpatient Medications:     cholecalciferol (Vitamin D3) 50 mcg (2,000 unit) capsule, Take 1 capsule (50 mcg) by mouth once daily., Disp: 1 capsule, Rfl: 0    escitalopram (Lexapro) 5 mg tablet, Take 1 tablet (5 mg) by mouth once daily., Disp: 90 tablet, Rfl: 3    fluticasone (Flonase) 50 mcg/actuation nasal spray, Administer 2 sprays into each nostril 2 times a day. (Patient taking differently: Administer 2 sprays into each nostril once daily.), Disp: 16 g, Rfl: 3    hydroCHLOROthiazide (HYDRODiuril) 12.5 mg tablet, TAKE 1 TABLET  BY MOUTH ONCE DAILY., Disp: 90 tablet, Rfl: 3    inulin (Child's Fiber Select Gummies) 1.5 gram tablet,chewable, 2 gummies a day, Disp: , Rfl:     levalbuterol (Xopenex) 45 mcg/actuation inhaler, Inhale 2 puffs every 4 hours if needed for wheezing or shortness of breath., Disp: 18 g, Rfl: 3    losartan (Cozaar) 100 mg tablet, TAKE 1 TABLET BY MOUTH EVERY DAY, Disp: 90 tablet, Rfl: 0    montelukast (Singulair) 10 mg tablet, Take 1 tablet (10 mg) by mouth once daily., Disp: 90 tablet, Rfl: 3     Objective   Well appearing patient in no apparent distress; mood and affect are within normal limits.      Assessment/Plan   1. Nevus  Uniform pigmented macule(s)/papule(s) with reassuring findings on dermoscopy    -Discussed nature of condition  -Reassurance, benign-appearing features on examination today  -Recommend continued observation    2. Skin cancer screening    Related Procedures  Follow Up In Dermatology - Established Patient    3. Lentigo  Tan macules    -Benign appearing on exam  -Reassurance, recommend observation    4. Cherry angioma  Cherry red papules    -Discussed nature of condition  -Reassurance, recommend continued observation    5. Seborrheic keratosis  Stuck on, waxy macule(s)/papule(s)/plaque(s) with comedo-like openings and milia like cysts    -Discussed nature of condition  -Reassurance, recommend continued observation    6. Screening exam for skin cancer

## 2024-12-16 NOTE — PROGRESS NOTES
ANNUAL CPE VISIT  Chief Complaint   Patient presents with    Annual Exam     HPI: Feels well  In Winter gets chest tightness   Can't take deep breath  Fine when running  Uses Xopenex    Labs reviewed:  Component      Latest Ref Rng 12/2/2024   WBC      4.4 - 11.3 x10*3/uL 5.6    nRBC      0.0 - 0.0 /100 WBCs 0.0    RBC      4.50 - 5.90 x10*6/uL 4.46 (L)    HEMOGLOBIN      13.5 - 17.5 g/dL 14.4    HEMATOCRIT      41.0 - 52.0 % 42.3    MCV      80 - 100 fL 95    MCH      26.0 - 34.0 pg 32.3    MCHC      32.0 - 36.0 g/dL 34.0    RED CELL DISTRIBUTION WIDTH      11.5 - 14.5 % 12.0    Platelets      150 - 450 x10*3/uL 242    Neutrophils %      40.0 - 80.0 % 56.4    Immature Granulocytes %, Automated      0.0 - 0.9 % 0.4    Lymphocytes %      13.0 - 44.0 % 27.7    Monocytes %      2.0 - 10.0 % 9.1    Eosinophils %      0.0 - 6.0 % 5.0    Basophils %      0.0 - 2.0 % 1.4    Neutrophils Absolute      1.20 - 7.70 x10*3/uL 3.18    Immature Granulocytes Absolute, Automated      0.00 - 0.70 x10*3/uL 0.02    Lymphocytes Absolute      1.20 - 4.80 x10*3/uL 1.56    Monocytes Absolute      0.10 - 1.00 x10*3/uL 0.51    Eosinophils Absolute      0.00 - 0.70 x10*3/uL 0.28    Basophils Absolute      0.00 - 0.10 x10*3/uL 0.08    GLUCOSE      74 - 99 mg/dL 92    SODIUM      136 - 145 mmol/L 143    POTASSIUM      3.5 - 5.3 mmol/L 4.3    CHLORIDE      98 - 107 mmol/L 105    Bicarbonate      21 - 32 mmol/L 33 (H)    Anion Gap      10 - 20 mmol/L 9 (L)    Blood Urea Nitrogen      6 - 23 mg/dL 13    Creatinine      0.50 - 1.30 mg/dL 1.05    EGFR      >60 mL/min/1.73m*2 84    Calcium      8.6 - 10.6 mg/dL 9.5    Albumin      3.4 - 5.0 g/dL 4.0    Alkaline Phosphatase      33 - 120 U/L 50    Total Protein      6.4 - 8.2 g/dL 6.7    AST      9 - 39 U/L 22    Bilirubin Total      0.0 - 1.2 mg/dL 0.8    ALT      10 - 52 U/L 25    Color, Urine      Light-Yellow, Yellow, Dark-Yellow  Light-Yellow    Appearance, Urine      Clear  Clear    Specific  Gravity, Urine      1.005 - 1.035  1.013    pH, Urine      5.0, 5.5, 6.0, 6.5, 7.0, 7.5, 8.0  5.5    Protein, Urine      NEGATIVE, 10 (TRACE), 20 (TRACE) mg/dL NEGATIVE    Glucose, Urine      Normal mg/dL Normal    Blood, Urine      NEGATIVE  NEGATIVE    Ketones, Urine      NEGATIVE mg/dL NEGATIVE    Bilirubin, Urine      NEGATIVE  NEGATIVE    Urobilinogen, Urine      Normal mg/dL Normal    Nitrite, Urine      NEGATIVE  NEGATIVE    Leukocyte Esterase, Urine      NEGATIVE  NEGATIVE    CHOLESTEROL      0 - 199 mg/dL 203 (H)    HDL CHOLESTEROL      mg/dL 57.1    Cholesterol/HDL Ratio 3.6    LDL Calculated      <=99 mg/dL 124 (H)    VLDL      0 - 40 mg/dL 22    TRIGLYCERIDES      0 - 149 mg/dL 110    Non HDL Cholesterol      0 - 149 mg/dL 146    Albumin, Urine Random      Not established mg/L <7.0    Creatinine, Urine Random      20.0 - 370.0 mg/dL 109.1    Albumin/Creatinine Ratio --    Vitamin D, 25-Hydroxy, Total      30 - 100 ng/mL 37    Thyroid Stimulating Hormone      0.44 - 3.98 mIU/L 2.16    Prostate Specific Antigen,Screen      <=4.00 ng/mL 1.04    Extra Tube Hold for add-ons.       Coronary artery calcium score of 0   Assessment and Plan:  Problem List Items Addressed This Visit          High    Routine adult health maintenance - Primary    Overview     COVID 19 Moderna Vaccine 3/19/21, 4/16/21, 12/11/21, 9/16/22, 10/13/23  Influenza 9/13/23, 10/3/24  Pneumococcal-13 Vac Vlltmmyhs49/11/2014   TD Adult2/1/2008  TDaP 8/3/18  PSA 6/15/19 1.56; 9/3/21 (1.1), 12/2/24 1.04  Cscope 2014(9yrs)  Influenza 11/6/20 2021: Coronary artery calcium score of 0          Current Assessment & Plan     Annual Wellness exam completed   Preventive Health History reviewed  Ordered:   Labs    Cologuard  Shingrix #1 today, will get #2 at pharmacy in [place] by his home in 2-6months         Relevant Medications    zoster vaccine-recombinant adjuvanted (Shingrix) 50 mcg/0.5 mL vaccine (Start on 2/16/2025)    Other Relevant Orders     "Comprehensive Metabolic Panel    CBC and Auto Differential    Lipid Panel    Urinalysis with Reflex Microscopic       Medium    Allergic rhinitis    Overview     continue flonase and singulair;         Anxiety    Overview     GAD76/2020 = 9/21  On SSRI         Relevant Orders    TSH with reflex to Free T4 if abnormal    RESOLVED: Asthma    Overview     Singulair and alb prn          Relevant Medications    fluticasone furoate-vilanteroL (Breo Ellipta) 100-25 mcg/dose inhaler    Hypertension, essential    Overview     Goal <130/80  6/20: our cuff 124/75 hr 50s his cuff 126/80 hr 57   On ARB, HCTZ added 1/24         Relevant Orders    Albumin-Creatinine Ratio, Urine Random    Screen for colon cancer    Relevant Orders    Cologuard® colon cancer screening    Screening for prostate cancer    Relevant Orders    Prostate Specific Antigen, Screen    Vitamin D deficiency    Overview     12/23: 30  Goal ~50         Relevant Orders    Vitamin D 25-Hydroxy,Total (for eval of Vitamin D levels)       ROS otherwise negative aside from what was mentioned above in HPI.    Vitals  /81   Pulse 72   Temp 36.4 °C (97.5 °F)   Ht 1.638 m (5' 4.5\")   Wt 66.4 kg (146 lb 6 oz)   SpO2 98%   BMI 24.74 kg/m²   Body mass index is 24.74 kg/m².  Physical Exam  Gen: Alert, NAD  HEENT:  Normal exam  Neck:  No masses/nodes palpable; Thyroid nontender and without nodules; No DAVID  Respiratory:  Lungs CTAB  CV: RRR  Neuro:  Gross motor and sensory intact  Skin:  No suspicious lesions present  Breast: No masses or axillary lymphadenopathy  ALYSIA: Prostate not enlarged. No prostate mass or nodule(s) palpated, brown stool. (Pt declined chaperone)      Allergies and Medications  Lisinopril and Penicillins  Current Outpatient Medications   Medication Instructions    cholecalciferol (VITAMIN D3) 50 mcg, oral, Daily    escitalopram (LEXAPRO) 5 mg, oral, Daily    fluticasone (Flonase) 50 mcg/actuation nasal spray 2 sprays, Each Nostril, 2 times daily "    fluticasone furoate-vilanteroL (Breo Ellipta) 100-25 mcg/dose inhaler 1 puff, inhalation, Daily    hydroCHLOROthiazide (MICROZIDE) 12.5 mg, oral, Daily    inulin (Child's Fiber Select Gummies) 1.5 gram tablet,chewable 2 gummies a day    levalbuterol (Xopenex) 45 mcg/actuation inhaler 2 puffs, inhalation, Every 4 hours PRN    losartan (COZAAR) 100 mg, oral, Daily    montelukast (SINGULAIR) 10 mg, oral, Daily    [START ON 2/16/2025] zoster vaccine-recombinant adjuvanted (Shingrix) 50 mcg/0.5 mL vaccine 0.5 mL, intramuscular, Once       Active Problem List  Patient Active Problem List   Diagnosis    Routine adult health maintenance    Allergic rhinitis    Anxiety    Degenerative lumbar spinal stenosis    Diverticulosis of colon    Hemorrhoids    Hypertension, essential    Lumbar disc disease    Melanocytic nevus    Moderate persistent asthma without complication ([facility])    Cervico-occipital neuralgia    Vitamin D deficiency    BMI 25.0-25.9,adult    Screening for prostate cancer    Screen for colon cancer       Comprehensive Medical/Surgical/Social/Family History  Past Medical History:   Diagnosis Date    H/O cardiovascular stress test     5/12: normal 1/2021:Summary: 1. The resting ejection fraction was estimated at 60 to 65% with a peak exercise ejection fraction estimated at 70 to 75%. 2. Peak HR= 164bpm which is 98% of APMHR. 3. Peak BP= 188/74. 4. METS acheived= 13.4. 5. Above average exercise capacity. 6. No ECG changes from baseline. 7. No echocardiographic or electrocardiographic evidence for ischemia at a maximal workload.    H/O CT scan 12/30/2020    10/11/21: CT Calcium score =0    H/O Doppler ultrasound     Mild-moderate stenosis at the proximal segment without plaque. There appears to be a diameter mismatch of proximal to mid subclavian artery US carotids 6/20: IMPRESSION: Minimal bilateral carotid plaque; no stenosis greater than 50%.    H/O Doppler ultrasound     6/2020: bilat carotid US Minimal  bilateral carotid plaque; no stenosis greater than 50%.    H/O magnetic resonance imaging of brain and brain stem     05/04: MILD PARANASAL SINUS INFLAMMATORY CHANGES.  OTHERWISE NORMAL    H/O magnetic resonance imaging of lumbar spine     4/17: degenrative changes L4-5 and L5-S1 w/disc space narrpowing and disk dessication, posterior disk bbulge. FAcet joint sclerosis and hypertrophy and LF hypertrophy, moderate stenosis of bilateral lateral recesses, at L4-5, wqorse on right. MIld stenosis lateral receses laterally L5-S1 worsde left. impingement upon exiting nerve roots biltarelly L4-5 and L5-S1 left. bilateral renal cysts    H/O x-ray of lumbar spine     2017: MILD DEGENERATIVE CHANGES    History of PFTs 05/23/2019    Reactive airway component with significant bronchodilator response in small airways despite normal PFTs. Repeat PFTs with lung volumes rec'd. 5/12    History of PFTs 02/08/2019 2008: Reactive airway component with significant bronchodilator response in small airways despite normal PFTs. Repeat PFTs with lung volumes rec'd     Past Surgical History:   Procedure Laterality Date    COLONOSCOPY      5/14: Diverticulosis in the sigmoid colon. Internal & External hemorrhoids. repeat in 10 years    OTHER SURGICAL HISTORY  07/06/2018    Pulmonary Function Tests    OTHER SURGICAL HISTORY  07/06/2018    Oral Surgery Tooth Extraction [facility]       Social History     Social History Narrative    Social History:     ([place]), one daughter    Works at [employer]    Nonsmoker    Occ ETOH    ---    Family History:    M: Gastric CA, Hypertension       F: Anxiety, AKs, Sleep issues, ET?                 B:  Asthma                                                    S:  Allergies

## 2024-12-19 ENCOUNTER — PATIENT MESSAGE (OUTPATIENT)
Dept: PRIMARY CARE | Facility: CLINIC | Age: 54
End: 2024-12-19
Payer: COMMERCIAL

## 2024-12-19 DIAGNOSIS — J45.40 MODERATE PERSISTENT ASTHMA, UNSPECIFIED WHETHER COMPLICATED (HHS-HCC): ICD-10-CM

## 2024-12-19 RX ORDER — FLUTICASONE PROPIONATE AND SALMETEROL 100; 50 UG/1; UG/1
1 POWDER RESPIRATORY (INHALATION)
Qty: 60 EACH | Refills: 11 | Status: SHIPPED | OUTPATIENT
Start: 2024-12-19 | End: 2025-12-19

## 2024-12-21 DIAGNOSIS — J45.909 ASTHMA, UNSPECIFIED ASTHMA SEVERITY, UNSPECIFIED WHETHER COMPLICATED, UNSPECIFIED WHETHER PERSISTENT (HHS-HCC): ICD-10-CM

## 2024-12-21 RX ORDER — FLUTICASONE PROPIONATE 50 MCG
2 SPRAY, SUSPENSION (ML) NASAL 2 TIMES DAILY
Qty: 48 ML | Refills: 1 | Status: SHIPPED | OUTPATIENT
Start: 2024-12-21

## 2025-01-13 DIAGNOSIS — I10 HYPERTENSION, ESSENTIAL: ICD-10-CM

## 2025-01-13 RX ORDER — HYDROCHLOROTHIAZIDE 12.5 MG/1
12.5 TABLET ORAL DAILY
Qty: 90 TABLET | Refills: 3 | Status: SHIPPED | OUTPATIENT
Start: 2025-01-13

## 2025-02-08 LAB — NONINV COLON CA DNA+OCC BLD SCRN STL QL: NEGATIVE

## 2025-02-28 DIAGNOSIS — J45.40 MODERATE PERSISTENT ASTHMA, UNCOMPLICATED (HHS-HCC): ICD-10-CM

## 2025-02-28 RX ORDER — LOSARTAN POTASSIUM 100 MG/1
100 TABLET ORAL DAILY
Qty: 90 TABLET | Refills: 0 | Status: SHIPPED | OUTPATIENT
Start: 2025-02-28

## 2025-05-30 DIAGNOSIS — J45.40 MODERATE PERSISTENT ASTHMA, UNCOMPLICATED (HHS-HCC): ICD-10-CM

## 2025-05-30 RX ORDER — LOSARTAN POTASSIUM 100 MG/1
100 TABLET ORAL DAILY
Qty: 90 TABLET | Refills: 0 | Status: SHIPPED | OUTPATIENT
Start: 2025-05-30

## 2025-06-24 DIAGNOSIS — J45.909 ASTHMA, UNSPECIFIED ASTHMA SEVERITY, UNSPECIFIED WHETHER COMPLICATED, UNSPECIFIED WHETHER PERSISTENT (HHS-HCC): ICD-10-CM

## 2025-06-24 RX ORDER — FLUTICASONE PROPIONATE 50 MCG
2 SPRAY, SUSPENSION (ML) NASAL 2 TIMES DAILY
Qty: 48 ML | Refills: 1 | Status: SHIPPED | OUTPATIENT
Start: 2025-06-24

## 2025-08-24 DIAGNOSIS — J45.40 MODERATE PERSISTENT ASTHMA, UNCOMPLICATED (HHS-HCC): ICD-10-CM

## 2025-08-24 RX ORDER — LOSARTAN POTASSIUM 100 MG/1
100 TABLET ORAL DAILY
Qty: 90 TABLET | Refills: 0 | Status: SHIPPED | OUTPATIENT
Start: 2025-08-24

## 2025-12-16 ENCOUNTER — APPOINTMENT (OUTPATIENT)
Dept: DERMATOLOGY | Facility: CLINIC | Age: 55
End: 2025-12-16
Payer: COMMERCIAL

## 2026-01-12 ENCOUNTER — APPOINTMENT (OUTPATIENT)
Dept: PRIMARY CARE | Facility: CLINIC | Age: 56
End: 2026-01-12
Payer: COMMERCIAL